# Patient Record
Sex: FEMALE | Race: WHITE | NOT HISPANIC OR LATINO | URBAN - METROPOLITAN AREA
[De-identification: names, ages, dates, MRNs, and addresses within clinical notes are randomized per-mention and may not be internally consistent; named-entity substitution may affect disease eponyms.]

---

## 2023-05-23 ENCOUNTER — OFFICE VISIT (OUTPATIENT)
Dept: FAMILY MEDICINE CLINIC | Facility: CLINIC | Age: 23
End: 2023-05-23

## 2023-05-23 VITALS
TEMPERATURE: 96.3 F | DIASTOLIC BLOOD PRESSURE: 76 MMHG | RESPIRATION RATE: 16 BRPM | SYSTOLIC BLOOD PRESSURE: 120 MMHG | BODY MASS INDEX: 26.29 KG/M2 | OXYGEN SATURATION: 98 % | WEIGHT: 154 LBS | HEIGHT: 64 IN | HEART RATE: 88 BPM

## 2023-05-23 DIAGNOSIS — R53.83 FATIGUE, UNSPECIFIED TYPE: ICD-10-CM

## 2023-05-23 DIAGNOSIS — Z13.220 SCREENING FOR HYPERLIPIDEMIA: ICD-10-CM

## 2023-05-23 DIAGNOSIS — Z00.00 HEALTH CARE MAINTENANCE: Primary | ICD-10-CM

## 2023-05-23 DIAGNOSIS — E66.3 OVERWEIGHT (BMI 25.0-29.9): ICD-10-CM

## 2023-05-23 NOTE — PATIENT INSTRUCTIONS
Get labs and diet and exercise and lose weight to get BMI lower than 25  See GYN and call if any problems  Use sunscreen and monitor for ticks  Has some fatigue and will check cbc anc cmp and thyroid labs

## 2023-05-23 NOTE — PROGRESS NOTES
Name: Ariel Simon      : 2000      MRN: [id number]  Encounter Provider: Alexy Caraballo DO  Encounter Date: 2023   Encounter department: [address] [place] [address]  Chief Complaint   Patient presents with   • New Patient Visit     Establish care      Patient Instructions   Get labs and diet and exercise and lose weight to get BMI lower than 25  See GYN and call if any problems  Use sunscreen and monitor for ticks  Has some fatigue and will check cbc anc cmp and thyroid labs  Assessment & Plan     1  Health care maintenance  -     Comprehensive metabolic panel; Future  -     CBC and differential; Future  -     TSH, 3rd generation; Future  -     T4, free  -     Lipid Panel with Direct LDL reflex; Future    2  Overweight (BMI 25 0-29 9)  -     Comprehensive metabolic panel; Future  -     CBC and differential; Future  -     TSH, 3rd generation; Future  -     T4, free    3  Fatigue, unspecified type  -     Comprehensive metabolic panel; Future  -     CBC and differential; Future  -     TSH, 3rd generation; Future  -     T4, free    4  Screening for hyperlipidemia  -     Comprehensive metabolic panel; Future  -     Lipid Panel with Direct LDL reflex; Future           Subjective      New Patient Visit (Establish care ) patient is getting a new job and has 2 children and single  Patient is a CNA  Non-smoker and does not drink alcohol  Review of Systems   Constitutional: Negative  HENT: Negative  Eyes: Negative  Respiratory: Negative  Cardiovascular: Negative  Gastrointestinal: Negative  Endocrine: Negative  Genitourinary: Negative  Musculoskeletal: Negative  Skin: Negative  Allergic/Immunologic: Negative  Neurological: Negative  Hematological: Negative  Psychiatric/Behavioral: Negative  No current outpatient medications on file prior to visit         Objective     /76 (BP Location: Left arm, Patient Position: Sitting, Cuff Size: "Adult)   Pulse 88   Temp (!) 96 3 °F (35 7 °C) (Temporal)   Resp 16   Ht 5' 3 5\" (1 613 m)   Wt 69 9 kg (154 lb)   SpO2 98%   BMI 26 85 kg/m²     Physical Exam  Constitutional:       Appearance: She is well-developed  Comments: overweight   HENT:      Head: Normocephalic and atraumatic  Right Ear: External ear normal       Left Ear: External ear normal       Nose: Nose normal    Eyes:      Conjunctiva/sclera: Conjunctivae normal       Pupils: Pupils are equal, round, and reactive to light  Cardiovascular:      Rate and Rhythm: Normal rate and regular rhythm  Heart sounds: Normal heart sounds  Pulmonary:      Effort: Pulmonary effort is normal       Breath sounds: Normal breath sounds  Abdominal:      General: Abdomen is flat  Bowel sounds are normal       Palpations: Abdomen is soft  Musculoskeletal:         General: Normal range of motion  Cervical back: Normal range of motion and neck supple  Skin:     General: Skin is warm and dry  Capillary Refill: Capillary refill takes less than 2 seconds  Neurological:      General: No focal deficit present  Mental Status: She is alert and oriented to person, place, and time  Mental status is at baseline  Deep Tendon Reflexes: Reflexes are normal and symmetric  Psychiatric:         Mood and Affect: Mood normal          Behavior: Behavior normal          Thought Content:  Thought content normal          Judgment: Judgment normal        Dona Nations, DO  "